# Patient Record
Sex: MALE | Race: WHITE | NOT HISPANIC OR LATINO | Employment: STUDENT | URBAN - METROPOLITAN AREA
[De-identification: names, ages, dates, MRNs, and addresses within clinical notes are randomized per-mention and may not be internally consistent; named-entity substitution may affect disease eponyms.]

---

## 2024-07-18 ENCOUNTER — OFFICE VISIT (OUTPATIENT)
Dept: URGENT CARE | Facility: CLINIC | Age: 24
End: 2024-07-18
Payer: COMMERCIAL

## 2024-07-18 VITALS
HEIGHT: 73 IN | SYSTOLIC BLOOD PRESSURE: 143 MMHG | OXYGEN SATURATION: 99 % | HEART RATE: 83 BPM | RESPIRATION RATE: 18 BRPM | TEMPERATURE: 98 F | WEIGHT: 170 LBS | BODY MASS INDEX: 22.53 KG/M2 | DIASTOLIC BLOOD PRESSURE: 87 MMHG

## 2024-07-18 DIAGNOSIS — R42 DIZZINESS: Primary | ICD-10-CM

## 2024-07-18 LAB — GLUCOSE SERPL-MCNC: 152 MG/DL (ref 70–110)

## 2024-07-18 PROCEDURE — 82962 GLUCOSE BLOOD TEST: CPT | Mod: S$GLB,,, | Performed by: NURSE PRACTITIONER

## 2024-07-18 PROCEDURE — 93010 ELECTROCARDIOGRAM REPORT: CPT | Mod: S$GLB,,, | Performed by: INTERNAL MEDICINE

## 2024-07-18 PROCEDURE — 93005 ELECTROCARDIOGRAM TRACING: CPT | Mod: S$GLB,,, | Performed by: NURSE PRACTITIONER

## 2024-07-18 PROCEDURE — 99214 OFFICE O/P EST MOD 30 MIN: CPT | Mod: S$GLB,,, | Performed by: NURSE PRACTITIONER

## 2024-07-18 RX ORDER — ALLOPURINOL 100 MG/1
100 TABLET ORAL
COMMUNITY
Start: 2023-08-02 | End: 2024-10-25

## 2024-07-18 RX ORDER — DAPAGLIFLOZIN 10 MG/1
10 TABLET, FILM COATED ORAL
COMMUNITY
Start: 2023-10-25 | End: 2025-01-17

## 2024-07-18 RX ORDER — LOSARTAN POTASSIUM 25 MG/1
25 TABLET ORAL
COMMUNITY
Start: 2023-05-18 | End: 2024-08-10

## 2024-07-18 NOTE — PATIENT INSTRUCTIONS
- You must understand that you have received an Urgent Care treatment only and that you may be released before all of your medical problems are known or treated.   - You, the patient, will arrange for follow up care as instructed.   - If your condition worsens or fails to improve we recommend that you receive another evaluation at the ER immediately or contact your PCP to discuss your concerns.   - You can call [phone] or [phone] to help schedule an appointment with the appropriate provider.    Strict ER precautions for new or worsening symptoms  Follow up with your Primary Care provider

## 2024-07-18 NOTE — PROGRESS NOTES
"Subjective:      Patient ID: Lexx Bain is a 24 y.o. male.    Vitals:  height is 6' 1" (1.854 m) and weight is 77.1 kg (170 lb). His oral temperature is 98.3 °F (36.8 °C). His blood pressure is 143/87 (abnormal) and his pulse is 83. His respiration is 18 and oxygen saturation is 99%.     Chief Complaint: Dizziness    Pt is a 24 y./o. male with the complaint of feeling faint and dizzy, other symptoms include minor chest pain, he says it gets slightly tight at times. Symptoms began a few weeks ago, but significantly worsened yesterday. Pt sates he almost passed out from dizziness this morning, everything went black. Pt states he was recently diagnosed with Type 1 diabetes, and also has chronic kidney disease.     Dizziness:   Chronicity:  New  Progression since onset:  Gradually worsening   Associated symptoms: palpitations and chest pain.no hearing loss, no ear congestion, no ear pain, no fever, no headaches, no tinnitus, no nausea, no vomiting, no diaphoresis, no aural fullness, no weakness, no visual disturbances, no light-headedness, no syncope, no panic, no facial weakness, no slurred speech and no numbness in extremities.  Aggravated by:  Nothing  Treatments tried:  Nothingno strokes, no cardiac surgery, no neurologic disease, no head trauma, no balance testing, no ear trauma, no ear surgery, no head trauma, no ear infections, no anxiety, no ear tubes, no environmental allergies, no MRI head and no CT head.      Constitution: Negative for sweating and fever.   HENT:  Negative for ear pain, tinnitus and hearing loss.    Cardiovascular:  Positive for chest pain and palpitations. Negative for passing out.   Gastrointestinal:  Negative for nausea and vomiting.   Allergic/Immunologic: Negative for environmental allergies.   Neurological:  Positive for dizziness. Negative for light-headedness and headaches.      Objective:     Physical Exam   Constitutional: He is oriented to person, place, and " time.   HENT:   Head: Normocephalic.   Ears:   Right Ear: Hearing, tympanic membrane, external ear and ear canal normal.   Left Ear: Hearing, tympanic membrane, external ear and ear canal normal.   Nose: Nose normal.   Mouth/Throat: Uvula is midline, oropharynx is clear and moist and mucous membranes are normal. Mucous membranes are moist. No oropharyngeal exudate.   Eyes: Conjunctivae are normal. Pupils are equal, round, and reactive to light. No visual field deficit is present. Right pupil is round, reactive and not sluggish. Left pupil is round, reactive and not sluggish. Pupils are equal.   Cardiovascular: Normal rate, regular rhythm and normal heart sounds.   No murmur heard.  Pulmonary/Chest: Effort normal and breath sounds normal. No apnea and no tachypnea. No respiratory distress. He has no decreased breath sounds. He has no wheezes. He has no rhonchi.   Musculoskeletal: Normal range of motion.         General: Normal range of motion.   Neurological: He is alert and oriented to person, place, and time. He has normal motor skills, normal sensation and intact cranial nerves (2-12). He displays no weakness, no tremor and facial symmetry. No cranial nerve deficit. He exhibits normal muscle tone. He has a normal Finger-Nose-Finger Test and a normal Tandem Gait Test. Coordination: Romberg sign negative, Heel to shin test normal and Rapid alternating movements normal. He shows no pronator drift. He displays no seizure activity. Gait and coordination normal. Coordination and gait normal.   Skin: Skin is dry.   Psychiatric: His behavior is normal. Mood and memory normal. Cognition normal, no impaired cognition and memory not impaired  Nursing note and vitals reviewed.    NSR on EKG, rightward axis, vent rate 67    Results for orders placed or performed in visit on 07/18/24   POCT Glucose, Hand-Held Device   Result Value Ref Range    POC Glucose 152 (A) 70 - 110 MG/DL     Vitals:    07/18/24 1339 07/18/24 1351  "07/18/24 1352 07/18/24 1353   BP: 125/73 (!) 143/91 (!) 147/98 (!) 143/87   BP Location: Right arm Right arm     Patient Position: Lying Sitting     BP Method: Medium (Automatic)      Pulse: 74 84 96 83   Resp: 18      Temp: 98.3 °F (36.8 °C)      TempSrc: Oral      SpO2: 99%      Weight: 77.1 kg (170 lb)      Height: 6' 1" (1.854 m)        Assessment:     1. Dizziness        Plan:       Dizziness  -     Orthostatic vital signs  -     POCT Glucose, Hand-Held Device  -     IN OFFICE EKG 12-LEAD (to Muse)      Patient Instructions   - You must understand that you have received an Urgent Care treatment only and that you may be released before all of your medical problems are known or treated.   - You, the patient, will arrange for follow up care as instructed.   - If your condition worsens or fails to improve we recommend that you receive another evaluation at the ER immediately or contact your PCP to discuss your concerns.   - You can call [phone] or [phone] to help schedule an appointment with the appropriate provider.    Strict ER precautions for new or worsening symptoms  Follow up with your Primary Care provider            "

## 2024-07-19 LAB
OHS QRS DURATION: 104 MS
OHS QTC CALCULATION: 401 MS